# Patient Record
Sex: MALE | Race: OTHER | NOT HISPANIC OR LATINO | URBAN - METROPOLITAN AREA
[De-identification: names, ages, dates, MRNs, and addresses within clinical notes are randomized per-mention and may not be internally consistent; named-entity substitution may affect disease eponyms.]

---

## 2021-09-08 ENCOUNTER — EMERGENCY (EMERGENCY)
Facility: HOSPITAL | Age: 26
LOS: 1 days | Discharge: HOME | End: 2021-09-08
Attending: STUDENT IN AN ORGANIZED HEALTH CARE EDUCATION/TRAINING PROGRAM
Payer: MEDICAID

## 2021-09-08 VITALS
WEIGHT: 210.1 LBS | SYSTOLIC BLOOD PRESSURE: 138 MMHG | DIASTOLIC BLOOD PRESSURE: 76 MMHG | TEMPERATURE: 99 F | RESPIRATION RATE: 18 BRPM | HEART RATE: 73 BPM | OXYGEN SATURATION: 97 %

## 2021-09-08 PROCEDURE — 99285 EMERGENCY DEPT VISIT HI MDM: CPT | Mod: 25

## 2021-09-08 PROCEDURE — 99284 EMERGENCY DEPT VISIT MOD MDM: CPT

## 2021-09-08 PROCEDURE — 96374 THER/PROPH/DIAG INJ IV PUSH: CPT

## 2021-09-08 RX ORDER — ONDANSETRON 8 MG/1
4 TABLET, FILM COATED ORAL ONCE
Refills: 0 | Status: COMPLETED | OUTPATIENT
Start: 2021-09-08 | End: 2021-09-08

## 2021-09-08 RX ORDER — SODIUM CHLORIDE 9 MG/ML
1000 INJECTION INTRAMUSCULAR; INTRAVENOUS; SUBCUTANEOUS ONCE
Refills: 0 | Status: COMPLETED | OUTPATIENT
Start: 2021-09-08 | End: 2021-09-08

## 2021-09-08 RX ORDER — ACETAMINOPHEN 500 MG
650 TABLET ORAL ONCE
Refills: 0 | Status: COMPLETED | OUTPATIENT
Start: 2021-09-08 | End: 2021-09-08

## 2021-09-08 RX ADMIN — ONDANSETRON 4 MILLIGRAM(S): 8 TABLET, FILM COATED ORAL at 22:29

## 2021-09-08 RX ADMIN — Medication 650 MILLIGRAM(S): at 22:29

## 2021-09-08 RX ADMIN — SODIUM CHLORIDE 1000 MILLILITER(S): 9 INJECTION INTRAMUSCULAR; INTRAVENOUS; SUBCUTANEOUS at 22:29

## 2021-09-08 NOTE — ED PROVIDER NOTE - NSFOLLOWUPINSTRUCTIONS_ED_ALL_ED_FT
Opioid Withdrawal    WHAT YOU NEED TO KNOW:    What do I need to know about opioid withdrawal? Withdrawal is a response to a sudden lack of opioids in your body. Withdrawal happens when you suddenly decrease or stop taking an opioid you are dependent on. Dependence means you feel you need the opioid to function mentally or physically. This happens after you have used the opioid regularly for a long time. Withdrawal can happen with an illegal opioid such as heroin, or a prescription opioid such as oxycodone or fentanyl.    What are the signs and symptoms of opioid withdrawal? Withdrawal signs and symptoms may start within 6 to 16 hours after you stop using the opioid. Signs and symptoms include:   •Overwhelming craving for the opioid      •Anxiety, irritability, depression      •Trouble sleeping, or being tired during the day      •Nausea, vomiting, or diarrhea      •Sweating or shaking      •Muscle aches or cramps      •Chills or goosebumps      •Yawning      •Runny nose or watery eyes      How is opioid withdrawal treated? You may need to stay in a hospital or drug treatment facility while you go through withdrawal so healthcare providers can help you. This depends on how long you have used the opioid and how much you have been taking. Your age and general health are also factors. You may need any of the following to treat opioid withdrawal or manage your symptoms:   •Medicines may be used to decrease symptoms such as nausea, vomiting, anxiety, or muscle tension. You may also need medicines to calm your stomach and prevent vomiting. Your healthcare provider may also recommend an over-the-counter medicine to relieve pain and muscle aches.      •Detoxification is the process of slowly decreasing the dose of opioid you are dependent on. Another medicine, such as methadone, may help decrease symptoms of withdrawal.      •Psychological counseling and support may be needed if you have opioid use disorder. This is a condition that makes you crave the opioid and not be able to stop using it.      What can I do to manage withdrawal?   •Drink more liquids to prevent dehydration. You can become dehydrated if you have diarrhea or are vomiting. Drink liquid throughout the day. You may need to sip it to prevent nausea and vomiting. Your healthcare provider may also recommend a oral rehydration solution (ORS). An ORS has the right amounts of water, salts, and sugar you need to replace body fluids.      •Do not use an opioid to relieve your symptoms. You may feel like you cannot get through withdrawal without using the opioid. It can be dangerous to use it during withdrawal. Even if you use a small amount or a different kind of opioid, you can have serious health problems.      What can I do to prevent withdrawal from a prescription opioid? The best way to prevent withdrawal is to prevent tolerance. You may need to take a different kind of pain medicine after a surgery or injury. You can also talk to your healthcare provider about ways to manage pain without medicine. If you do need to take an opioid medicine, the following can help prevent withdrawal:   •Do not suddenly stop using the opioid. If you have been using the opioid for longer than 2 weeks, a sudden stop may cause dangerous side effects. Work with your healthcare provider to decrease your dose slowly.      •Take a prescribed opioid exactly as directed. Do not take more than the recommended amount. Do not take it more often or for longer than recommended. If you use a pain patch, be sure to remove the old patch before you place a new one. Talk to your doctor or a pharmacist if you have any questions about your medicine. Opioids often come with a Medication Guide to help you use it safely. Ask your pharmacists for a copy if you do not get one when you fill the prescription.      •Talk to your provider about signs or symptoms of a problem. Tell your provider if you think you are developing opioid tolerance or dependence. Work with your provider to stop or lower the amount safely.      What do I need to know about opioid safety?   •Do not take opioids that belong to someone else. The kind or amount that person takes may not be right for you.      •Do not mix opioids with other medicines or alcohol. The combination can cause an overdose, or cause you to stop breathing. Alcohol, sleeping pills, and medicines such as antihistamines can make you sleepy. A combination with opioids can lead to a coma.      •Learn about the signs of an overdose so you know how to respond. Tell others about these signs so they will know what to do if needed. Talk to your healthcare provider about naloxone. You may be able to keep naloxone at home in case of an overdose. Your family and friends can also be trained on how to give it to you if needed.      •Keep opioids out of the reach of children. Store opioids in a locked cabinet or in a location that children cannot get to.      •Follow instructions for what to do with prescription opioids you do not use. Your healthcare provider will give you instructions for how to dispose of it safely. This helps make sure no one else takes it.      Call your local emergency number (911 in the [place]), or have someone else call if:   •You have a seizure.      •You cannot be woken.      When should I call my doctor?   •You have a fast heartbeat.      •You have nausea and are vomiting, or you cannot stop vomiting.      •You have the following signs and symptoms of dehydration: ?Dry eyes or mouth      ?Increased thirst      ?Dark yellow urine, or urinating little or not at all      ?Headache, dizziness, or confusion      ?Irregular or fast breathing, fast or pounding heartbeat      •You have questions or concerns about your condition or care.      CARE AGREEMENT:    You have the right to help plan your care. Learn about your health condition and how it may be treated. Discuss treatment options with your healthcare providers to decide what care you want to receive. You always have the right to refuse treatment.

## 2021-09-08 NOTE — ED PROVIDER NOTE - PHYSICAL EXAMINATION
Constitutional: Awake, alert, in no acute distress  Eyes: no scleral icterus  HENT: normocephalic, atraumatic, moist oral mucosa  Neck: supple  CV: RRR, no murmur  Pulm: non-labored respirations, CTAB  Abdomen: soft, non-tender, non-distended  Extremities: no edema, no deformity  Skin: no rash, no jaundice  Neuro: AAOx3, moving all extremities equally

## 2021-09-08 NOTE — ED PROVIDER NOTE - NS ED ROS FT
Constitutional: no fever, no chills  Eyes: no vision changes  ENT: no nasal congestion, no sore throat  CV: no chest pain  Resp: no cough, no shortness of breath  GI: no abdominal pain, +vomiting, +diarrhea  : no dysuria  MSK: no joint pain  Skin: no rash  Neuro: +headache, no weakness, no paresthesias

## 2021-09-08 NOTE — ED PROVIDER NOTE - ATTENDING CONTRIBUTION TO CARE
I performed a face to face history and physical exam of the patient and discussed their management with the student/resident/ACP. I reviewed the student/resident/ACP's note and agree with the documented findings and plan of care.    Pt sent from police precinct for opiate withdrawal. Pt with body aches and nausea. last used percocet 2 d ago. no other complaints.    physical - rrr. ctab. abd - soft, nt. no edema. no rash.    plan - Pt given ivf and antiemetics. Pt cleared for confinement. will d/c back into police custody.

## 2021-09-08 NOTE — ED PROVIDER NOTE - OBJECTIVE STATEMENT
25y M w/ hx opioid use, [facility].  Pt takes Percocet; last used ~2 days ago.  Complains of headache and n/v/d.  Denies other drug use.

## 2021-09-08 NOTE — ED PROVIDER NOTE - PATIENT PORTAL LINK FT
You can access the FollowMyHealth Patient Portal offered by [facility] by registering at the following website: [email or web address]. By joining [employer]’s FollowMyHealth portal, you will also be able to view your health information using other applications (apps) compatible with our system.

## 2022-01-23 ENCOUNTER — EMERGENCY (EMERGENCY)
Facility: HOSPITAL | Age: 27
LOS: 1 days | Discharge: HOME | End: 2022-01-23
Attending: EMERGENCY MEDICINE
Payer: MEDICAID

## 2022-01-23 VITALS
TEMPERATURE: 98 F | DIASTOLIC BLOOD PRESSURE: 82 MMHG | HEART RATE: 78 BPM | SYSTOLIC BLOOD PRESSURE: 122 MMHG | OXYGEN SATURATION: 100 % | RESPIRATION RATE: 19 BRPM

## 2022-01-23 VITALS
RESPIRATION RATE: 18 BRPM | HEART RATE: 74 BPM | OXYGEN SATURATION: 98 % | HEIGHT: 67 IN | DIASTOLIC BLOOD PRESSURE: 83 MMHG | WEIGHT: 190.04 LBS | TEMPERATURE: 100 F | SYSTOLIC BLOOD PRESSURE: 135 MMHG

## 2022-01-23 PROBLEM — F11.10 OPIOID ABUSE, UNCOMPLICATED: Chronic | Status: ACTIVE | Noted: 2021-09-08

## 2022-01-23 PROCEDURE — 99283 EMERGENCY DEPT VISIT LOW MDM: CPT

## 2022-01-23 RX ORDER — BUPRENORPHINE AND NALOXONE 2; .5 MG/1; MG/1
1 TABLET SUBLINGUAL ONCE
Refills: 0 | Status: DISCONTINUED | OUTPATIENT
Start: 2022-01-23 | End: 2022-01-23

## 2022-01-23 RX ORDER — LOPERAMIDE HCL 2 MG
4 TABLET ORAL ONCE
Refills: 0 | Status: COMPLETED | OUTPATIENT
Start: 2022-01-23 | End: 2022-01-23

## 2022-01-23 RX ADMIN — Medication 4 MILLIGRAM(S): at 01:38

## 2022-01-23 RX ADMIN — BUPRENORPHINE AND NALOXONE 1 TABLET(S): 2; .5 TABLET SUBLINGUAL at 01:38

## 2022-01-23 RX ADMIN — Medication 20 MILLIGRAM(S): at 01:38

## 2022-01-23 NOTE — ED PROVIDER NOTE - PATIENT PORTAL LINK FT
You can access the FollowMyHealth Patient Portal offered by [facility] by registering at the following website: [email or web address]. By joining [employer]’s FollowMyHealth portal, you will also be able to view your health information using other applications (apps) compatible with our system.

## 2022-01-23 NOTE — ED PROVIDER NOTE - NSFOLLOWUPINSTRUCTIONS_ED_ALL_ED_FT
You were medicated with Suboxone 8 mg, Imodium 4 mg and Bentyl 20 mg  You are medically stable and for confinement   Return sooner for any problems

## 2022-01-23 NOTE — ED PROVIDER NOTE - OBJECTIVE STATEMENT
25 y/o male with PMHx of opioid abuse brought in by [employer] in custody c/o withdrawal from percocet and fentanyl. Pt states he used 10-15 percocet 30's daily. Pt c/o abdominal pain and loose stool.

## 2022-01-23 NOTE — ED ADULT TRIAGE NOTE - AS TEMP SITE
patient back from [place]. PA at bedside discussing plan of care. Repeat troponin pending. Comfort and safety provided. oral

## 2022-01-23 NOTE — ED ADULT TRIAGE NOTE - CHIEF COMPLAINT QUOTE
C/o substance abuse withdrawal. Pt states, "I take 5-10 "M-Box 30" pills daily, last used yesterday". Pt states, "These are street drugs cut with Fentanyl and Percocet from [place]". +Nausea, diarrhea, cold sweats, and lower abdominal pain. Pt in [employer] custody.

## 2022-03-24 ENCOUNTER — EMERGENCY (EMERGENCY)
Facility: HOSPITAL | Age: 27
LOS: 1 days | Discharge: HOME | End: 2022-03-24
Attending: EMERGENCY MEDICINE
Payer: MEDICAID

## 2022-03-24 VITALS
OXYGEN SATURATION: 98 % | RESPIRATION RATE: 18 BRPM | TEMPERATURE: 98 F | HEART RATE: 74 BPM | SYSTOLIC BLOOD PRESSURE: 99 MMHG | DIASTOLIC BLOOD PRESSURE: 55 MMHG

## 2022-03-24 VITALS
TEMPERATURE: 98 F | SYSTOLIC BLOOD PRESSURE: 107 MMHG | DIASTOLIC BLOOD PRESSURE: 74 MMHG | WEIGHT: 190.04 LBS | OXYGEN SATURATION: 99 % | RESPIRATION RATE: 20 BRPM | HEIGHT: 67 IN | HEART RATE: 80 BPM

## 2022-03-24 LAB
ALBUMIN SERPL ELPH-MCNC: 4.4 G/DL — SIGNIFICANT CHANGE UP (ref 3.3–5.2)
ALP SERPL-CCNC: 67 U/L — SIGNIFICANT CHANGE UP (ref 40–120)
ALT FLD-CCNC: 14 U/L — SIGNIFICANT CHANGE UP
ANION GAP SERPL CALC-SCNC: 13 MMOL/L — SIGNIFICANT CHANGE UP (ref 5–17)
AST SERPL-CCNC: 18 U/L — SIGNIFICANT CHANGE UP
BASOPHILS # BLD AUTO: 0.06 K/UL — SIGNIFICANT CHANGE UP (ref 0–0.2)
BASOPHILS NFR BLD AUTO: 0.6 % — SIGNIFICANT CHANGE UP (ref 0–2)
BILIRUB SERPL-MCNC: 0.4 MG/DL — SIGNIFICANT CHANGE UP (ref 0.4–2)
BUN SERPL-MCNC: 7 MG/DL — LOW (ref 8–20)
CALCIUM SERPL-MCNC: 9.7 MG/DL — SIGNIFICANT CHANGE UP (ref 8.6–10.2)
CHLORIDE SERPL-SCNC: 99 MMOL/L — SIGNIFICANT CHANGE UP (ref 98–107)
CO2 SERPL-SCNC: 24 MMOL/L — SIGNIFICANT CHANGE UP (ref 22–29)
CREAT SERPL-MCNC: 0.87 MG/DL — SIGNIFICANT CHANGE UP (ref 0.5–1.3)
EGFR: 124 ML/MIN/1.73M2 — SIGNIFICANT CHANGE UP
EOSINOPHIL # BLD AUTO: 0.02 K/UL — SIGNIFICANT CHANGE UP (ref 0–0.5)
EOSINOPHIL NFR BLD AUTO: 0.2 % — SIGNIFICANT CHANGE UP (ref 0–6)
ETHANOL SERPL-MCNC: <10 MG/DL — SIGNIFICANT CHANGE UP (ref 0–9)
GLUCOSE SERPL-MCNC: 156 MG/DL — HIGH (ref 70–99)
HCT VFR BLD CALC: 41.1 % — SIGNIFICANT CHANGE UP (ref 39–50)
HGB BLD-MCNC: 13.2 G/DL — SIGNIFICANT CHANGE UP (ref 13–17)
IMM GRANULOCYTES NFR BLD AUTO: 0.3 % — SIGNIFICANT CHANGE UP (ref 0–1.5)
LYMPHOCYTES # BLD AUTO: 0.96 K/UL — LOW (ref 1–3.3)
LYMPHOCYTES # BLD AUTO: 10.3 % — LOW (ref 13–44)
MAGNESIUM SERPL-MCNC: 2.1 MG/DL — SIGNIFICANT CHANGE UP (ref 1.6–2.6)
MCHC RBC-ENTMCNC: 26.7 PG — LOW (ref 27–34)
MCHC RBC-ENTMCNC: 32.1 GM/DL — SIGNIFICANT CHANGE UP (ref 32–36)
MCV RBC AUTO: 83 FL — SIGNIFICANT CHANGE UP (ref 80–100)
MONOCYTES # BLD AUTO: 0.28 K/UL — SIGNIFICANT CHANGE UP (ref 0–0.9)
MONOCYTES NFR BLD AUTO: 3 % — SIGNIFICANT CHANGE UP (ref 2–14)
NEUTROPHILS # BLD AUTO: 8 K/UL — HIGH (ref 1.8–7.4)
NEUTROPHILS NFR BLD AUTO: 85.6 % — HIGH (ref 43–77)
PHOSPHATE SERPL-MCNC: 1.9 MG/DL — LOW (ref 2.4–4.7)
PLATELET # BLD AUTO: 360 K/UL — SIGNIFICANT CHANGE UP (ref 150–400)
POTASSIUM SERPL-MCNC: 4.2 MMOL/L — SIGNIFICANT CHANGE UP (ref 3.5–5.3)
POTASSIUM SERPL-SCNC: 4.2 MMOL/L — SIGNIFICANT CHANGE UP (ref 3.5–5.3)
PROT SERPL-MCNC: 8.2 G/DL — SIGNIFICANT CHANGE UP (ref 6.6–8.7)
RBC # BLD: 4.95 M/UL — SIGNIFICANT CHANGE UP (ref 4.2–5.8)
RBC # FLD: 13.7 % — SIGNIFICANT CHANGE UP (ref 10.3–14.5)
SODIUM SERPL-SCNC: 136 MMOL/L — SIGNIFICANT CHANGE UP (ref 135–145)
WBC # BLD: 9.35 K/UL — SIGNIFICANT CHANGE UP (ref 3.8–10.5)
WBC # FLD AUTO: 9.35 K/UL — SIGNIFICANT CHANGE UP (ref 3.8–10.5)

## 2022-03-24 PROCEDURE — 96375 TX/PRO/DX INJ NEW DRUG ADDON: CPT

## 2022-03-24 PROCEDURE — 83735 ASSAY OF MAGNESIUM: CPT

## 2022-03-24 PROCEDURE — U0005: CPT

## 2022-03-24 PROCEDURE — 99284 EMERGENCY DEPT VISIT MOD MDM: CPT

## 2022-03-24 PROCEDURE — 96374 THER/PROPH/DIAG INJ IV PUSH: CPT

## 2022-03-24 PROCEDURE — 84100 ASSAY OF PHOSPHORUS: CPT

## 2022-03-24 PROCEDURE — 80307 DRUG TEST PRSMV CHEM ANLYZR: CPT

## 2022-03-24 PROCEDURE — 99284 EMERGENCY DEPT VISIT MOD MDM: CPT | Mod: 25

## 2022-03-24 PROCEDURE — 85025 COMPLETE CBC W/AUTO DIFF WBC: CPT

## 2022-03-24 PROCEDURE — U0003: CPT

## 2022-03-24 PROCEDURE — 96376 TX/PRO/DX INJ SAME DRUG ADON: CPT

## 2022-03-24 PROCEDURE — 93010 ELECTROCARDIOGRAM REPORT: CPT

## 2022-03-24 PROCEDURE — 36415 COLL VENOUS BLD VENIPUNCTURE: CPT

## 2022-03-24 PROCEDURE — 93005 ELECTROCARDIOGRAM TRACING: CPT

## 2022-03-24 PROCEDURE — 80053 COMPREHEN METABOLIC PANEL: CPT

## 2022-03-24 RX ORDER — BUPRENORPHINE AND NALOXONE 2; .5 MG/1; MG/1
1 TABLET SUBLINGUAL ONCE
Refills: 0 | Status: DISCONTINUED | OUTPATIENT
Start: 2022-03-24 | End: 2022-03-24

## 2022-03-24 RX ORDER — SODIUM CHLORIDE 9 MG/ML
1000 INJECTION INTRAMUSCULAR; INTRAVENOUS; SUBCUTANEOUS ONCE
Refills: 0 | Status: COMPLETED | OUTPATIENT
Start: 2022-03-24 | End: 2022-03-24

## 2022-03-24 RX ORDER — SODIUM,POTASSIUM PHOSPHATES 278-250MG
2 POWDER IN PACKET (EA) ORAL EVERY 4 HOURS
Refills: 0 | Status: COMPLETED | OUTPATIENT
Start: 2022-03-24 | End: 2022-03-24

## 2022-03-24 RX ORDER — ONDANSETRON 8 MG/1
8 TABLET, FILM COATED ORAL ONCE
Refills: 0 | Status: COMPLETED | OUTPATIENT
Start: 2022-03-24 | End: 2022-03-24

## 2022-03-24 RX ADMIN — Medication 1 TABLET(S): at 15:47

## 2022-03-24 RX ADMIN — Medication 2 TABLET(S): at 19:38

## 2022-03-24 RX ADMIN — BUPRENORPHINE AND NALOXONE 1 TABLET(S): 2; .5 TABLET SUBLINGUAL at 19:55

## 2022-03-24 RX ADMIN — Medication 1 MILLIGRAM(S): at 19:54

## 2022-03-24 RX ADMIN — Medication 2 MILLIGRAM(S): at 15:38

## 2022-03-24 RX ADMIN — SODIUM CHLORIDE 1000 MILLILITER(S): 9 INJECTION INTRAMUSCULAR; INTRAVENOUS; SUBCUTANEOUS at 15:38

## 2022-03-24 RX ADMIN — ONDANSETRON 8 MILLIGRAM(S): 8 TABLET, FILM COATED ORAL at 15:38

## 2022-03-24 RX ADMIN — Medication 2 TABLET(S): at 23:25

## 2022-03-24 NOTE — ED ADULT NURSE NOTE - CHPI ED NUR SYMPTOMS NEG
no abdominal distension/no abdominal pain/no chills/no confusion/no disorientation/no fever/no weakness

## 2022-03-24 NOTE — ED PROVIDER NOTE - OBJECTIVE STATEMENT
Pertinent PMH/PSH/FHx/SHx and Review of Systems contained within:  Patient presents to the ED for reported EtOH and opiate withdrawal.  States last smoked percocet and drank EtOh about 1-1.5 days ago.  CIWA 6. Patient states has N/V/D.  VSS.  no trauma..  No PMH.  otherwise baseline. Non toxic.  Well appearing. No aggravating or relieving factors. No other pertinent PMH.  No other pertinent PSH.  No other pertinent FHx.  Patient denies EtOH/tobacco/illicit substance use. No fever/chills, No photophobia/eye pain/changes in vision, No ear pain/sore throat/dysphagia, No palpitations, no SOB/cough/wheeze/stridor, No abdominal pain,  no dysuria/frequency/discharge, No neck/back pain, no rash, no changes in neurological status/function.

## 2022-03-24 NOTE — ED ADULT NURSE NOTE - OBJECTIVE STATEMENT
Patient presented to Ed with ETOH withdrawal and percocet withdrawal. Ciwa score at this time is a 9. Patient last smoked percocet and drank alcohol was 1 to 1.5 days ago. Medications given as ordered with blood work completed.

## 2022-03-24 NOTE — ED ADULT NURSE REASSESSMENT NOTE - NS ED NURSE REASSESS COMMENT FT1
Assumed care for patient. Patient was agitated and non complaint to RNs wants, Patient kept getting up to visit his brother.  patient was requesting suboxone. Medication was ordered and given. Patient then got up went to see his brother and gave him half the medication. RN called for assistance Patient was assisted back to his room. Ativan given x2. Ciwa score is now a 0, Patient is now sleeping and calm at this time.

## 2022-03-24 NOTE — ED ADULT TRIAGE NOTE - CHIEF COMPLAINT QUOTE
pt c/o alcohol withdrawal & chest pain pt c/o alcohol withdrawal & chest pain  last drink 1 day ago,   A&Ox3, resp wnl,

## 2022-03-24 NOTE — ED PROVIDER NOTE - CARE PLAN
Principal Discharge DX:	Opiate dependence  Secondary Diagnosis:	Alcohol dependence with withdrawal   1

## 2022-03-24 NOTE — ED PROVIDER NOTE - CLINICAL SUMMARY MEDICAL DECISION MAKING FREE TEXT BOX
Patient presents with combined opiate and alcohol withdrawal.  lorazepam and buprenorphine given.  CIWA of 10 and COWS of 13.  Lab values do not require emergent intervention. phosphate low and repleted.  Patient wants to go to rehab.  VSS.  Uneventful ED observation period. Case d/w Dr. Lowe at [facility] and accepted for detox.  Discussed signs and symptoms and reasons for return with good teachback. SW following.

## 2022-03-24 NOTE — CHART NOTE - NSCHARTNOTEFT_GEN_A_CORE
MAYUR Note: MAYUR made aware pt is here for detox. SW met with pt, pt reports he uses 10 percocet pills daily as well as drinks a gallon of liquor a day,, last use yesterday. Pt reports he has been to [facility] a few months ago for detox, SW explained ro pt that they do not accept outside trx to their detox unit. Pt is agreeable to trx to [facility] for detox should they have a bed, ED provider aware, MYAUR following MAYUR Note: MAYUR made aware pt is here for detox. SW met with pt, pt reports he uses 10 percocet pills daily as well as drinks a gallon of liquor a day,, last use yesterday. Pt reports he has been to [facility] a few months ago for detox, MAYUR explained to pt that they do not accept outside trx to their detox unit. Pt is agreeable to trx to [facility] for detox should they have a bed, ED provider aware, MAYUR following

## 2022-03-24 NOTE — ED PROVIDER NOTE - PATIENT PORTAL LINK FT
You can access the FollowMyHealth Patient Portal offered by [facility] by registering at the following website: [email or web address]. By joining [employer]’s FollowMyHealth portal, you will also be able to view your health information using other applications (apps) compatible with our system.

## 2022-03-25 LAB — SARS-COV-2 RNA SPEC QL NAA+PROBE: SIGNIFICANT CHANGE UP

## 2022-06-13 ENCOUNTER — EMERGENCY (EMERGENCY)
Facility: HOSPITAL | Age: 27
LOS: 1 days | Discharge: HOME | End: 2022-06-13
Attending: EMERGENCY MEDICINE
Payer: MEDICAID

## 2022-06-13 VITALS
OXYGEN SATURATION: 99 % | RESPIRATION RATE: 18 BRPM | TEMPERATURE: 98 F | DIASTOLIC BLOOD PRESSURE: 76 MMHG | HEART RATE: 80 BPM | WEIGHT: 195.99 LBS | SYSTOLIC BLOOD PRESSURE: 116 MMHG | HEIGHT: 67 IN

## 2022-06-13 LAB
ALBUMIN SERPL ELPH-MCNC: 4.5 G/DL — SIGNIFICANT CHANGE UP (ref 3.3–5.2)
ALP SERPL-CCNC: 61 U/L — SIGNIFICANT CHANGE UP (ref 40–120)
ALT FLD-CCNC: 8 U/L — SIGNIFICANT CHANGE UP
ANION GAP SERPL CALC-SCNC: 13 MMOL/L — SIGNIFICANT CHANGE UP (ref 5–17)
APAP SERPL-MCNC: <3 UG/ML — LOW (ref 10–26)
APPEARANCE UR: CLEAR — SIGNIFICANT CHANGE UP
AST SERPL-CCNC: 12 U/L — SIGNIFICANT CHANGE UP
BASOPHILS # BLD AUTO: 0.07 K/UL — SIGNIFICANT CHANGE UP (ref 0–0.2)
BASOPHILS NFR BLD AUTO: 0.9 % — SIGNIFICANT CHANGE UP (ref 0–2)
BILIRUB SERPL-MCNC: 0.4 MG/DL — SIGNIFICANT CHANGE UP (ref 0.4–2)
BILIRUB UR-MCNC: NEGATIVE — SIGNIFICANT CHANGE UP
BUN SERPL-MCNC: 9.3 MG/DL — SIGNIFICANT CHANGE UP (ref 8–20)
CALCIUM SERPL-MCNC: 9.4 MG/DL — SIGNIFICANT CHANGE UP (ref 8.6–10.2)
CHLORIDE SERPL-SCNC: 99 MMOL/L — SIGNIFICANT CHANGE UP (ref 98–107)
CO2 SERPL-SCNC: 25 MMOL/L — SIGNIFICANT CHANGE UP (ref 22–29)
COLOR SPEC: YELLOW — SIGNIFICANT CHANGE UP
CREAT SERPL-MCNC: 0.77 MG/DL — SIGNIFICANT CHANGE UP (ref 0.5–1.3)
DIFF PNL FLD: NEGATIVE — SIGNIFICANT CHANGE UP
EGFR: 128 ML/MIN/1.73M2 — SIGNIFICANT CHANGE UP
EOSINOPHIL # BLD AUTO: 0.04 K/UL — SIGNIFICANT CHANGE UP (ref 0–0.5)
EOSINOPHIL NFR BLD AUTO: 0.5 % — SIGNIFICANT CHANGE UP (ref 0–6)
ETHANOL SERPL-MCNC: <10 MG/DL — SIGNIFICANT CHANGE UP (ref 0–9)
GLUCOSE SERPL-MCNC: 126 MG/DL — HIGH (ref 70–99)
GLUCOSE UR QL: NEGATIVE MG/DL — SIGNIFICANT CHANGE UP
HCT VFR BLD CALC: 38.5 % — LOW (ref 39–50)
HGB BLD-MCNC: 12.5 G/DL — LOW (ref 13–17)
HIV 1 & 2 AB SERPL IA.RAPID: SIGNIFICANT CHANGE UP
IMM GRANULOCYTES NFR BLD AUTO: 0.3 % — SIGNIFICANT CHANGE UP (ref 0–1.5)
KETONES UR-MCNC: NEGATIVE — SIGNIFICANT CHANGE UP
LEUKOCYTE ESTERASE UR-ACNC: NEGATIVE — SIGNIFICANT CHANGE UP
LYMPHOCYTES # BLD AUTO: 2.05 K/UL — SIGNIFICANT CHANGE UP (ref 1–3.3)
LYMPHOCYTES # BLD AUTO: 26.7 % — SIGNIFICANT CHANGE UP (ref 13–44)
MCHC RBC-ENTMCNC: 26.8 PG — LOW (ref 27–34)
MCHC RBC-ENTMCNC: 32.5 GM/DL — SIGNIFICANT CHANGE UP (ref 32–36)
MCV RBC AUTO: 82.6 FL — SIGNIFICANT CHANGE UP (ref 80–100)
MONOCYTES # BLD AUTO: 0.3 K/UL — SIGNIFICANT CHANGE UP (ref 0–0.9)
MONOCYTES NFR BLD AUTO: 3.9 % — SIGNIFICANT CHANGE UP (ref 2–14)
NEUTROPHILS # BLD AUTO: 5.21 K/UL — SIGNIFICANT CHANGE UP (ref 1.8–7.4)
NEUTROPHILS NFR BLD AUTO: 67.7 % — SIGNIFICANT CHANGE UP (ref 43–77)
NITRITE UR-MCNC: NEGATIVE — SIGNIFICANT CHANGE UP
PCP SPEC-MCNC: SIGNIFICANT CHANGE UP
PH UR: 8 — SIGNIFICANT CHANGE UP (ref 5–8)
PLATELET # BLD AUTO: 327 K/UL — SIGNIFICANT CHANGE UP (ref 150–400)
POTASSIUM SERPL-MCNC: 4 MMOL/L — SIGNIFICANT CHANGE UP (ref 3.5–5.3)
POTASSIUM SERPL-SCNC: 4 MMOL/L — SIGNIFICANT CHANGE UP (ref 3.5–5.3)
PROT SERPL-MCNC: 7.7 G/DL — SIGNIFICANT CHANGE UP (ref 6.6–8.7)
PROT UR-MCNC: NEGATIVE — SIGNIFICANT CHANGE UP
RAPID RVP RESULT: SIGNIFICANT CHANGE UP
RBC # BLD: 4.66 M/UL — SIGNIFICANT CHANGE UP (ref 4.2–5.8)
RBC # FLD: 13.3 % — SIGNIFICANT CHANGE UP (ref 10.3–14.5)
SALICYLATES SERPL-MCNC: <0.6 MG/DL — LOW (ref 10–20)
SARS-COV-2 RNA SPEC QL NAA+PROBE: SIGNIFICANT CHANGE UP
SODIUM SERPL-SCNC: 137 MMOL/L — SIGNIFICANT CHANGE UP (ref 135–145)
SP GR SPEC: 1.01 — SIGNIFICANT CHANGE UP (ref 1.01–1.02)
UROBILINOGEN FLD QL: NEGATIVE MG/DL — SIGNIFICANT CHANGE UP
WBC # BLD: 7.69 K/UL — SIGNIFICANT CHANGE UP (ref 3.8–10.5)
WBC # FLD AUTO: 7.69 K/UL — SIGNIFICANT CHANGE UP (ref 3.8–10.5)

## 2022-06-13 PROCEDURE — 99285 EMERGENCY DEPT VISIT HI MDM: CPT

## 2022-06-13 PROCEDURE — 70450 CT HEAD/BRAIN W/O DYE: CPT | Mod: 26,MA

## 2022-06-13 RX ORDER — BUPRENORPHINE AND NALOXONE 2; .5 MG/1; MG/1
1 TABLET SUBLINGUAL ONCE
Refills: 0 | Status: DISCONTINUED | OUTPATIENT
Start: 2022-06-13 | End: 2022-06-13

## 2022-06-13 RX ORDER — SODIUM CHLORIDE 9 MG/ML
3 INJECTION INTRAMUSCULAR; INTRAVENOUS; SUBCUTANEOUS ONCE
Refills: 0 | Status: COMPLETED | OUTPATIENT
Start: 2022-06-13 | End: 2022-06-13

## 2022-06-13 RX ORDER — DIAZEPAM 5 MG
5 TABLET ORAL ONCE
Refills: 0 | Status: DISCONTINUED | OUTPATIENT
Start: 2022-06-13 | End: 2022-06-13

## 2022-06-13 RX ADMIN — Medication 5 MILLIGRAM(S): at 20:41

## 2022-06-13 RX ADMIN — Medication 0.1 MILLIGRAM(S): at 20:41

## 2022-06-13 RX ADMIN — SODIUM CHLORIDE 3 MILLILITER(S): 9 INJECTION INTRAMUSCULAR; INTRAVENOUS; SUBCUTANEOUS at 21:56

## 2022-06-13 NOTE — ED PROVIDER NOTE - NEURO NEGATIVE STATEMENT, MLM
(+)tenderness to right side of head, no loss of consciousness, no gait abnormality, no sensory deficits, and no weakness.

## 2022-06-13 NOTE — ED ADULT NURSE NOTE - OBJECTIVE STATEMENT
Pt requesting detox from alcohol and opiates. Pt reports taking "fake" percocet that is fentanyl as well as heroin. Drinks 750ml of vodka per day. Last drink was yesterday.

## 2022-06-13 NOTE — ED PROVIDER NOTE - NSICDXPASTMEDICALHX_GEN_ALL_CORE_FT
PAST MEDICAL HISTORY:  Opioid abuse        PAST MEDICAL HISTORY:  Alcohol abuse     Opioid abuse

## 2022-06-13 NOTE — ED PROVIDER NOTE - NS_ATTENDINGSCRIBE_ED_ALL_ED
carried
I personally performed the service described in the documentation recorded by the scribe in my presence, and it accurately and completely records my words and actions.
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
## 2022-06-13 NOTE — ED PROVIDER NOTE - NEUROLOGICAL, MLM
(+)gross motor symmetric and normal, Alert and oriented, no focal deficits, no motor or sensory deficits.

## 2022-06-13 NOTE — ED PROVIDER NOTE - NSFOLLOWUPINSTRUCTIONS_ED_ALL_ED_FT
return to the ED if symptoms worsen, fever/chills, nausea/vomiting, chest pain, shortness of breath. You [facility] today.

## 2022-06-13 NOTE — ED PROVIDER NOTE - CLINICAL SUMMARY MEDICAL DECISION MAKING FREE TEXT BOX
pt with polysubstance abuse presenting with withdrawal symptoms and requesting detox. Will assess for significant acute metabolic condition and if stable will treat his symptoms and attempt to help him get into a detox program

## 2022-06-13 NOTE — ED ADULT NURSE NOTE - NSIMPLEMENTINTERV_GEN_ALL_ED
Implemented All Universal Safety Interventions:  [place] to call system. Call bell, personal items and telephone within reach. Instruct patient to call for assistance. Room bathroom lighting operational. Non-slip footwear when patient is off stretcher. Physically safe environment: no spills, clutter or unnecessary equipment. Stretcher in lowest position, wheels locked, appropriate side rails in place.

## 2022-06-13 NOTE — ED PROVIDER NOTE - CONSTITUTIONAL, MLM
normal... (+)uncomfortable appearing, awake, alert, oriented to person, place, time/situation and in no apparent distress. (+)uncomfortable appearing, awake, alert, oriented to person, place, time/situation

## 2022-06-13 NOTE — ED PROVIDER NOTE - PATIENT PORTAL LINK FT
You can access the FollowMyHealth Patient Portal offered by [facility] by registering at the following website: [email or web address]. By joining [employer]s FollowMyHealth portal, you will also be able to view your health information using other applications (apps) compatible with our system.

## 2022-06-13 NOTE — ED PROVIDER NOTE - PROGRESS NOTE DETAILS
Michael ALVAREZ for ED attending, Dr. Boyce, pt requesting Suboxone stating he used to be on 12mg years ago. Still states he has not used any opiates in over 24 hours Patient with improved symptoms but still states he is uncomfortable. Will re-medicate. Patient resting comfortably. polly: pt signed out by dr. galdamez pending sbirt eval- now accepted to [facility] by dr. piper for detox
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
## 2022-06-14 VITALS
RESPIRATION RATE: 18 BRPM | DIASTOLIC BLOOD PRESSURE: 65 MMHG | TEMPERATURE: 99 F | SYSTOLIC BLOOD PRESSURE: 110 MMHG | OXYGEN SATURATION: 98 % | HEART RATE: 72 BPM

## 2022-06-14 PROCEDURE — 81003 URINALYSIS AUTO W/O SCOPE: CPT

## 2022-06-14 PROCEDURE — 70450 CT HEAD/BRAIN W/O DYE: CPT | Mod: MA

## 2022-06-14 PROCEDURE — 99285 EMERGENCY DEPT VISIT HI MDM: CPT | Mod: 25

## 2022-06-14 PROCEDURE — 80053 COMPREHEN METABOLIC PANEL: CPT

## 2022-06-14 PROCEDURE — 93010 ELECTROCARDIOGRAM REPORT: CPT

## 2022-06-14 PROCEDURE — 93005 ELECTROCARDIOGRAM TRACING: CPT

## 2022-06-14 PROCEDURE — 96374 THER/PROPH/DIAG INJ IV PUSH: CPT

## 2022-06-14 PROCEDURE — 36415 COLL VENOUS BLD VENIPUNCTURE: CPT

## 2022-06-14 PROCEDURE — 80307 DRUG TEST PRSMV CHEM ANLYZR: CPT

## 2022-06-14 PROCEDURE — 86703 HIV-1/HIV-2 1 RESULT ANTBDY: CPT

## 2022-06-14 PROCEDURE — 0225U NFCT DS DNA&RNA 21 SARSCOV2: CPT

## 2022-06-14 PROCEDURE — 85025 COMPLETE CBC W/AUTO DIFF WBC: CPT

## 2022-06-14 RX ORDER — BUPRENORPHINE AND NALOXONE 2; .5 MG/1; MG/1
1 TABLET SUBLINGUAL ONCE
Refills: 0 | Status: DISCONTINUED | OUTPATIENT
Start: 2022-06-14 | End: 2022-06-14

## 2022-06-14 RX ORDER — ONDANSETRON 8 MG/1
4 TABLET, FILM COATED ORAL ONCE
Refills: 0 | Status: COMPLETED | OUTPATIENT
Start: 2022-06-14 | End: 2022-06-14

## 2022-06-14 RX ADMIN — BUPRENORPHINE AND NALOXONE 1 TABLET(S): 2; .5 TABLET SUBLINGUAL at 07:55

## 2022-06-14 RX ADMIN — BUPRENORPHINE AND NALOXONE 1 TABLET(S): 2; .5 TABLET SUBLINGUAL at 14:32

## 2022-06-14 RX ADMIN — Medication 25 MILLIGRAM(S): at 09:09

## 2022-06-14 RX ADMIN — ONDANSETRON 4 MILLIGRAM(S): 8 TABLET, FILM COATED ORAL at 09:09

## 2022-06-14 RX ADMIN — BUPRENORPHINE AND NALOXONE 1 TABLET(S): 2; .5 TABLET SUBLINGUAL at 00:05

## 2022-06-14 NOTE — ED ADULT NURSE REASSESSMENT NOTE - NS ED NURSE REASSESS COMMENT FT1
Pt asking for Suboxone. CIWA as documented. Report given to [facility] facility RN. Chart to be faxed over. IV dc per [facility] nurse request. Pt to be picked up at 1730 pm. Made aware and agrees to plan. Will continue to monitor.

## 2022-06-14 NOTE — CHART NOTE - NSCHARTNOTEFT_GEN_A_CORE
SOCIAL WORK NOTE: PEER TO PEER COMPLETED WITH DR TONY AND DR POWERS AND PATIENT ACCEPTED TO [facility] FOR INPATIENT DETOX. AMBULANCE ARRANGED FOR 5;30 PM AND [facility] AND AMBULANCE BILLING FORM LEFT ON DC RACK. ALL CLINICALS FAXED TO [facility] TO FAX # [phone].

## 2022-06-14 NOTE — ED ADULT NURSE REASSESSMENT NOTE - NS ED NURSE REASSESS COMMENT FT1
pt given a lunch tray. AAO x3. Offers no complaints at this time. Awaiting POC. Will continue to monitor

## 2022-06-14 NOTE — SBIRT NOTE ADULT - NSSBIRTALCPASSREFTXDET_GEN_A_CORE
Provided SBIRT services: Full screen positive. Referral to Treatment Performed. Screening results were   reviewed with the patient and patient was provided information about healthy guidelines and potential negative   consequences associated with level of risk. Motivation and readiness to reduce or stop use was discussed and   goals and activities to make changes were suggested/offered

## 2022-06-14 NOTE — ED ADULT NURSE REASSESSMENT NOTE - NS ED NURSE REASSESS COMMENT FT1
Pt AAO x3 in stretcher. Lungs CTA. Abd s/nt. Skin intact. IV in place and flushing with blood return. Awaiting SBIRT. Breakfast tray given. Will continue to monitor. In view of nursing station. CHARTED AT 0800:     Pt AAO x3 in stretcher. Lungs CTA. Abd s/nt. Skin intact. IV in place and flushing with blood return. Awaiting SBIRT. Breakfast tray given. Will continue to monitor. In view of nursing station.

## 2022-06-20 ENCOUNTER — EMERGENCY (EMERGENCY)
Facility: HOSPITAL | Age: 27
LOS: 1 days | Discharge: HOME | End: 2022-06-20
Attending: EMERGENCY MEDICINE
Payer: MEDICAID

## 2022-06-20 VITALS
HEIGHT: 67 IN | OXYGEN SATURATION: 96 % | TEMPERATURE: 98 F | SYSTOLIC BLOOD PRESSURE: 110 MMHG | RESPIRATION RATE: 20 BRPM | WEIGHT: 184.97 LBS | DIASTOLIC BLOOD PRESSURE: 66 MMHG | HEART RATE: 59 BPM

## 2022-06-20 PROCEDURE — 99285 EMERGENCY DEPT VISIT HI MDM: CPT

## 2022-06-20 PROCEDURE — 72125 CT NECK SPINE W/O DYE: CPT | Mod: MA

## 2022-06-20 PROCEDURE — 99284 EMERGENCY DEPT VISIT MOD MDM: CPT | Mod: 25

## 2022-06-20 PROCEDURE — 70450 CT HEAD/BRAIN W/O DYE: CPT | Mod: MA

## 2022-06-20 NOTE — ED PROVIDER NOTE - PROGRESS NOTE DETAILS
Christy Morales, Resident: [facility]. [facility] states the pt was never admitted or a patient of the hospital and was just beginning the admissions process. Pt improved, no complaints at this time. Pt demonstrates understanding of condition, return precautions, red flags, and need for follow up and agrees with plan.

## 2022-06-20 NOTE — ED PROVIDER NOTE - OBJECTIVE STATEMENT
Pt is 26 y.o. M hx EtOH abuse, opioid abuse presenting after fall today. The pt states he was standing up, tripped, and fell backwards. +Head trauma, no LOC. Denies neck pain, numbness, tingling or weakness.

## 2022-06-20 NOTE — ED PROVIDER NOTE - NSFOLLOWUPCLINICS_GEN_ALL_ED_FT
[id number] [place] [address]  Phone: [phone]  Fax:   Follow Up Time: 4-6 Days

## 2022-06-20 NOTE — ED PROVIDER NOTE - PATIENT PORTAL LINK FT
You can access the FollowMyHealth Patient Portal offered by [facility] by registering at the following website: [email or web address]. By joining [employer]’s FollowMyHealth portal, you will also be able to view your health information using other applications (apps) compatible with our system.

## 2022-06-20 NOTE — ED ADULT TRIAGE NOTE - CHIEF COMPLAINT QUOTE
S/P fall with LOC at [facility]. PT went there for rehab and while being triaged fell backwards from standing height and hit head.  No blood thinners. Hematoma noted to back of head and C/O pain to site. GCS15. Able to MAEx4 with equal strength. Denies dizziness or blurred vision. Reports taking 3 Trazadone and one 8mg of Suboxone today. IV in place by EMS with 1000ml NS bolus infusing.

## 2022-06-20 NOTE — ED PROVIDER NOTE - NSFOLLOWUPINSTRUCTIONS_ED_ALL_ED_FT
Head Injury, Adult       There are many types of head injuries. They can be as minor as a small bump. Some head injuries can be worse. Worse injuries include:    A strong hit to the head that shakes the brain back and forth, causing damage (concussion).  A bruise (contusion) of the brain. This means there is bleeding in the brain that can cause swelling.  A cracked skull (skull fracture).  Bleeding in the brain that gathers, gets thick (makes a clot), and forms a bump (hematoma).    Most problems from a head injury come in the first 24 hours. However, you may still have side effects up to 7–10 days after your injury. It is important to watch your condition for any changes. You may need to be watched in the emergency department or urgent care, or you may need to stay in the hospital.    What are the causes?  There are many possible causes of a head injury. A serious head injury may be caused by:    A car accident.  Bicycle or motorcycle accidents.  Sports injuries.  Falls.  Being hit by an object.    What are the signs or symptoms?  Symptoms of a head injury include a bruise, bump, or bleeding where the injury happened. Other physical symptoms may include:    Headache.  Feeling like you may vomit (nauseous) or vomiting.  Dizziness.  Blurred or double vision.  Being uncomfortable around bright lights or loud noises.  Shaking movements that you cannot control (seizures).  Feeling tired.  Trouble being woken up.  Fainting or loss of consciousness.    Mental or emotional symptoms may include:    Feeling grumpy or cranky.  Confusion and memory problems.  Having trouble paying attention or concentrating.  Changes in eating or sleeping habits.  Feeling worried or nervous (anxious).  Feeling sad (depressed).    How is this treated?  Treatment for this condition depends on how severe the injury is and the type of injury you have. The main goal is to prevent problems and to allow the brain time to heal.        Mild head injury    If you have a mild head injury, you may be sent home, and treatment may include:    Being watched. A responsible adult should stay with you for 24 hours after your injury and check on you often.  Physical rest.  Brain rest.  Pain medicines.        Severe head injury    If you have a severe head injury, treatment may include:    Being watched closely. This includes staying in the hospital.  Medicines to:    Help with pain.  Prevent seizures.  Help with brain swelling.  Protecting your airway and using a machine that helps you breathe (ventilator).  Treatments to watch for and manage swelling inside the brain.  Brain surgery. This may be needed to:    Remove a collection of blood or blood clots.  Stop the bleeding.  Remove a part of the skull. This allows room for the brain to swell.    Follow these instructions at home:      Activity    Rest.  Avoid activities that are hard or tiring.  Make sure you get enough sleep.  Let your brain rest. Do this by limiting activities that need a lot of thought or attention, such as:    Watching TV.  Playing memory games and puzzles.  Job-related work or homework.  Working on the computer, social media, and texting.  Avoid activities that could cause another head injury until your doctor says it is okay. This includes playing sports. Having another head injury, especially before the first one has healed, can be dangerous.  Ask your doctor when it is safe for you to go back to your normal activities, such as work or school. Ask your doctor for a step-by-step plan for slowly going back to your normal activities.  Ask your doctor when you can drive, ride a bicycle, or use heavy machinery. Do not do these activities if you are dizzy.        Lifestyle     Do not drink alcohol until your doctor says it is okay.  Do not use drugs.  If it is harder than usual to remember things, write them down.  If you are easily distracted, try to do one thing at a time.  Talk with family members or close friends when making important decisions.  Tell your friends, family, a trusted co-worker, and  about your injury, symptoms, and limits (restrictions). Have them watch for any problems that are new or getting worse.        General instructions    Take over-the-counter and prescription medicines only as told by your doctor.  Have someone stay with you for 24 hours after your head injury. This person should watch you for any changes in your symptoms and be ready to get help.  Keep all follow-up visits as told by your doctor. This is important.        How is this prevented?    Work on your balance and strength. This can help you avoid falls.  Wear a seat belt when you are in a moving vehicle.  Wear a helmet when you:    Ride a bicycle.  Ski.  Do any other sport or activity that has a risk of injury.  If you drink alcohol:    Limit how much you use to:    0–1 drink a day for nonpregnant women.  0–2 drinks a day for men.  Be aware of how much alcohol is in your drink. In the [place] one drink equals one 12 oz bottle of beer (355 mL), one 5 oz glass of wine (148 mL), or one 1½ oz glass of hard liquor (44 mL).  Make your home safer by:    Getting rid of clutter from the floors and stairs. This includes things that can make you trip.  Using grab bars in bathrooms and handrails by stairs.  Placing non-slip mats on floors and in bathtubs.  Putting more light in dim areas.    Where to find more information  Centers for Disease Control and Prevention: www.cdc.gov    Get help right away if:  You have:    A very bad headache that is not helped by medicine.  Trouble walking or weakness in your arms and legs.  Clear or bloody fluid coming from your nose or ears.  Changes in how you see (vision).  A seizure.  More confusion or more grumpy moods.  Your symptoms get worse.  You are sleepier than normal and have trouble staying awake.  You lose your balance.  The black centers of your eyes (pupils) change in size.  Your speech is slurred.  Your dizziness gets worse.  You vomit.    These symptoms may be an emergency. Do not wait to see if the symptoms will go away. Get medical help right away. Call your local emergency services (911 in the [place] Do not drive yourself to the hospital.    Summary  Head injuries can be as minor as a small bump. Some head injuries can be worse.  Treatment for this condition depends on how severe the injury is and the type of injury you have.  Have someone stay with you for 24 hours after your head injury.  Ask your doctor when it is safe for you to go back to your normal activities, such as work or school.  To prevent a head injury, wear a seat belt in a car, wear a helmet when you use a bicycle, limit your alcohol use, and make your home safer.    ADDITIONAL NOTES AND INSTRUCTIONS    Please follow up with your Primary MD in 24-48 hr.  Seek immediate medical care for any new/worsening signs or symptoms.

## 2022-06-20 NOTE — ED ADULT NURSE NOTE - CHIEF COMPLAINT QUOTE
S/P fall with LOC at [facility]. PT went there for rehab and while being triaged fell backwards from standing height and hit head.  No blood thinners. Hematoma noted to back of head and C/O pain to site. GCS15. Able to MAEx4 with equal strength. Denies dizziness or blurred vision. Reports taking 3 Trazadone and one 8mg of Suboxone today. IV in place by EMS with 1000ml NS bolus infusing.

## 2022-06-20 NOTE — ED PROVIDER NOTE - ATTENDING CONTRIBUTION TO CARE
I personally saw the patient with the resident, and completed the key components of the history and physical exam. I then discussed the management plan with the resident.      General: NAD, ENMT: Airway patent, mucous membranes moist, Cardiac: Normal rate, regular rhythm, Respiratory: breath sounds equal and clear bilaterally Head: NC/AT

## 2022-06-20 NOTE — ED PROVIDER NOTE - PHYSICAL EXAMINATION
General: well appearing, NAD  Head:  NC, AT  Eyes: EOMI, PERRLA, no scleral icterus  Ears: no erythema/drainage  Nose: midline, no bleeding/drainage  Throat: MMM  Cardiac: RRR, no m/r/g, no lower extremity edema  Respiratory: CTABL, no wheezes/rales/rhonchi, equal chest wall expansions, no use of accessory muscles, no retractions  Abdomen: soft, nondistended, nontender, no rebound tenderness, no guarding, nonperitonitic  MSK/Vascular: full ROM, soft compartments, warm extremities  Neuro: Alert and oriented, motor/sensory intact  Psych: calm, cooperative, normal affect

## 2022-06-20 NOTE — ED PROVIDER NOTE - CLINICAL SUMMARY MEDICAL DECISION MAKING FREE TEXT BOX
Pt is a 26 y.o. M hx of EtOH and opioid abuse presenting after mechanical fall. Pt refusing to wear C-collar despite knowing and understanding risks of not wearing cervical collar relayed to him.

## 2022-06-21 VITALS
DIASTOLIC BLOOD PRESSURE: 59 MMHG | SYSTOLIC BLOOD PRESSURE: 98 MMHG | TEMPERATURE: 98 F | RESPIRATION RATE: 18 BRPM | HEART RATE: 60 BPM | OXYGEN SATURATION: 98 %

## 2022-06-21 PROBLEM — F10.10 ALCOHOL ABUSE, UNCOMPLICATED: Chronic | Status: ACTIVE | Noted: 2022-06-14

## 2022-06-21 PROCEDURE — 70450 CT HEAD/BRAIN W/O DYE: CPT | Mod: 26,MA

## 2022-06-21 PROCEDURE — 72125 CT NECK SPINE W/O DYE: CPT | Mod: 26,MA

## 2022-12-15 ENCOUNTER — EMERGENCY (EMERGENCY)
Facility: HOSPITAL | Age: 27
LOS: 1 days | Discharge: HOME | End: 2022-12-15
Attending: EMERGENCY MEDICINE
Payer: MEDICAID

## 2022-12-15 VITALS
DIASTOLIC BLOOD PRESSURE: 74 MMHG | OXYGEN SATURATION: 96 % | RESPIRATION RATE: 20 BRPM | WEIGHT: 177.69 LBS | SYSTOLIC BLOOD PRESSURE: 119 MMHG | HEART RATE: 79 BPM | TEMPERATURE: 98 F

## 2022-12-15 PROCEDURE — 99283 EMERGENCY DEPT VISIT LOW MDM: CPT

## 2022-12-15 PROCEDURE — 99284 EMERGENCY DEPT VISIT MOD MDM: CPT

## 2022-12-15 RX ORDER — BUPRENORPHINE AND NALOXONE 2; .5 MG/1; MG/1
1 TABLET SUBLINGUAL ONCE
Refills: 0 | Status: DISCONTINUED | OUTPATIENT
Start: 2022-12-15 | End: 2022-12-15

## 2022-12-15 RX ORDER — BUPRENORPHINE AND NALOXONE 2; .5 MG/1; MG/1
1 TABLET SUBLINGUAL
Qty: 4 | Refills: 0
Start: 2022-12-15 | End: 2022-12-18

## 2022-12-15 RX ADMIN — BUPRENORPHINE AND NALOXONE 1 TABLET(S): 2; .5 TABLET SUBLINGUAL at 21:13

## 2022-12-15 NOTE — ED ADULT TRIAGE NOTE - CHIEF COMPLAINT QUOTE
27 M, nad, aaox3 c/o etoh and opiate withdrawal last use yesterday. Pt reports drinking a gallon and a half of vodka or torres daily x 2 years and using percocet and fentanyl daily for same period of time. Pt noted to have BLUE tremors, c/o nausea and headache, denies vomiting

## 2022-12-15 NOTE — CHART NOTE - NSCHARTNOTEFT_GEN_A_CORE
SW Note: Worker alerted by MD that pt is medically cleared for d/c and is requesting resources for substance abuse rehabs/detox/outpatient. Worker met with pt, leaving him resources for [facility], COS list of rehabs on [place] and other information including [facility]. Worker encouraged pt to contact [facility], as they will need to do a screening if he seeking a bed. Pt in agreement. MD made aware. No other SW needs.

## 2022-12-15 NOTE — ED PROVIDER NOTE - PATIENT PORTAL LINK FT
You can access the FollowMyHealth Patient Portal offered by [facility] by registering at the following website: [email or web address]. By joining [employer]’s FollowMyHealth portal, you will also be able to view your health information using other applications (apps) compatible with our system.

## 2022-12-15 NOTE — ED ADULT NURSE REASSESSMENT NOTE - NS ED NURSE REASSESS COMMENT FT1
Pt d/c by MD Price.  Sw gave pt resources.  NAD noted, respirations even and unlabored.  Safety precautions in place.  Pt demonstrated understanding of d/c instructions.

## 2022-12-15 NOTE — ED PROVIDER NOTE - OBJECTIVE STATEMENT
patient requesting detox placement; last alcohol and opioid ingestion one day ago; no other complaints

## 2022-12-15 NOTE — ED PROVIDER NOTE - NSICDXPASTSURGICALHX_GEN_ALL_CORE_FT
Pt's Mom given discharge instructions by Dr Reva Medrano she verbalizes an understanding pt stable at time of discharge
PAST SURGICAL HISTORY:  No significant past surgical history

## 2022-12-15 NOTE — ED PROVIDER NOTE - CLINICAL SUMMARY MEDICAL DECISION MAKING FREE TEXT BOX
unable to find open detox centers to accept patient at this time; patient clinically stable; Suboxone script sent; SW assessment noted; patient comfortable with plan

## 2022-12-15 NOTE — ED ADULT TRIAGE NOTE - GLASGOW COMA SCALE: SCORE, MLM
reviewed; no aberrant behavior identified, prescription authorized.  RX sent.  
Dr Jackson, please, advise  Patient asking for Lorazepam refill- via [employer] message  Last refill 3/23/2020, # 30 with 5 refills  Rx pended  LOV 6/15/2020  Future visit 12/18/2020  
15

## 2024-02-20 NOTE — ED ADULT NURSE NOTE - COVID-19 RESULT DATE/TIME
Medication Refill Request    Date of phone call: 2-20-24    Medication being requested: norco  mg sig: Take 1 tablet by mouth Every 6 (Six) Hours As Needed for Moderate Pain   Qty: 120    Date of last visit: 1-19-24    Date of last refill:     HOME up to date?:     Next Follow up?: 3-19-24    Any new pertinent information? (i.e, new medication allergies, new use of medications, change in patient's health or condition, non-compliance or inconsistency with prescribing agreement?):     Reviewed UDS and HOME. Both updated and appropriate. Refill appropriate.       Price (Do Not Change): 0.00 Detail Level: Simple Instructions: This plan will send the code FBSE to the PM system.  DO NOT or CHANGE the price. 13-Jun-2022 22:45

## 2024-11-14 ENCOUNTER — EMERGENCY (EMERGENCY)
Facility: HOSPITAL | Age: 29
LOS: 1 days | Discharge: HOME | End: 2024-11-14
Attending: STUDENT IN AN ORGANIZED HEALTH CARE EDUCATION/TRAINING PROGRAM
Payer: MEDICAID

## 2024-11-14 VITALS
RESPIRATION RATE: 16 BRPM | WEIGHT: 190.04 LBS | SYSTOLIC BLOOD PRESSURE: 129 MMHG | HEART RATE: 97 BPM | DIASTOLIC BLOOD PRESSURE: 89 MMHG | TEMPERATURE: 98 F | OXYGEN SATURATION: 100 % | HEIGHT: 67 IN

## 2024-11-14 PROCEDURE — 99282 EMERGENCY DEPT VISIT SF MDM: CPT

## 2024-11-14 PROCEDURE — 99283 EMERGENCY DEPT VISIT LOW MDM: CPT

## 2024-11-14 NOTE — ED PROVIDER NOTE - PATIENT PORTAL LINK FT
You can access the FollowMyHealth Patient Portal offered by [facility] by registering at the following website: [email or web address]. By joining [employer]’s FollowMyHealth portal, you will also be able to view your health information using other applications (apps) compatible with our system.

## 2024-11-14 NOTE — ED ADULT TRIAGE NOTE - CHIEF COMPLAINT QUOTE
sent in for eval from skilled nursing. as per pd, coworker had trouble waking pt up. pt arrives a and ox3, calm and appropriate. has no complaints.

## 2024-11-14 NOTE — ED PROVIDER NOTE - NS ED MD DISPO DISCHARGE CCDA
States he is returning a call to schedule a sooner appointment. RN re-scheduled patient for [address], cancelled Saturday appt. Advised to call back if worse or to ER. You are scheduled for an echocardiogram on 11/9/18 at 2:00 pm. Arrive at 1:45 pm.   You will register on the second floor of the hospital in [facility].  Follow up 6 months or sooner if needed   Patient/Caregiver provided printed discharge information.

## 2024-11-14 NOTE — ED ADULT TRIAGE NOTE - NS ED TRIAGE AVPU SCALE
Awaiting patients response.      Alert-The patient is alert, awake and responds to voice. The patient is oriented to time, place, and person. The triage nurse is able to obtain subjective information.

## 2024-11-14 NOTE — ED PROVIDER NOTE - OBJECTIVE STATEMENT
Pt is  a29 yo M BIB for eval. pt was being processed this morning and was hard to arouse so kina brought him in for eval. Pt awake alert and cooperative here with no complaints.  Pt states that hew as up all night and just fell asleep. No

## 2024-12-31 ENCOUNTER — EMERGENCY (EMERGENCY)
Facility: HOSPITAL | Age: 29
LOS: 1 days | Discharge: HOME | End: 2024-12-31
Attending: STUDENT IN AN ORGANIZED HEALTH CARE EDUCATION/TRAINING PROGRAM
Payer: MEDICARE

## 2024-12-31 VITALS
HEIGHT: 67 IN | TEMPERATURE: 98 F | WEIGHT: 168.43 LBS | SYSTOLIC BLOOD PRESSURE: 136 MMHG | HEART RATE: 118 BPM | RESPIRATION RATE: 18 BRPM | DIASTOLIC BLOOD PRESSURE: 77 MMHG | OXYGEN SATURATION: 96 %

## 2024-12-31 LAB
BASOPHILS # BLD AUTO: 0.08 K/UL — SIGNIFICANT CHANGE UP (ref 0–0.2)
BASOPHILS NFR BLD AUTO: 0.8 % — SIGNIFICANT CHANGE UP (ref 0–2)
EOSINOPHIL # BLD AUTO: 0.02 K/UL — SIGNIFICANT CHANGE UP (ref 0–0.5)
EOSINOPHIL NFR BLD AUTO: 0.2 % — SIGNIFICANT CHANGE UP (ref 0–6)
HCT VFR BLD CALC: 38.9 % — LOW (ref 39–50)
HGB BLD-MCNC: 12.3 G/DL — LOW (ref 13–17)
IMM GRANULOCYTES NFR BLD AUTO: 0.3 % — SIGNIFICANT CHANGE UP (ref 0–0.9)
LYMPHOCYTES # BLD AUTO: 1.62 K/UL — SIGNIFICANT CHANGE UP (ref 1–3.3)
LYMPHOCYTES # BLD AUTO: 15.8 % — SIGNIFICANT CHANGE UP (ref 13–44)
MCHC RBC-ENTMCNC: 26.3 PG — LOW (ref 27–34)
MCHC RBC-ENTMCNC: 31.6 G/DL — LOW (ref 32–36)
MCV RBC AUTO: 83.3 FL — SIGNIFICANT CHANGE UP (ref 80–100)
MONOCYTES # BLD AUTO: 0.48 K/UL — SIGNIFICANT CHANGE UP (ref 0–0.9)
MONOCYTES NFR BLD AUTO: 4.7 % — SIGNIFICANT CHANGE UP (ref 2–14)
NEUTROPHILS # BLD AUTO: 8.04 K/UL — HIGH (ref 1.8–7.4)
NEUTROPHILS NFR BLD AUTO: 78.2 % — HIGH (ref 43–77)
PLATELET # BLD AUTO: 468 K/UL — HIGH (ref 150–400)
RBC # BLD: 4.67 M/UL — SIGNIFICANT CHANGE UP (ref 4.2–5.8)
RBC # FLD: 13 % — SIGNIFICANT CHANGE UP (ref 10.3–14.5)
WBC # BLD: 10.27 K/UL — SIGNIFICANT CHANGE UP (ref 3.8–10.5)
WBC # FLD AUTO: 10.27 K/UL — SIGNIFICANT CHANGE UP (ref 3.8–10.5)

## 2024-12-31 PROCEDURE — 99285 EMERGENCY DEPT VISIT HI MDM: CPT

## 2024-12-31 PROCEDURE — 71045 X-RAY EXAM CHEST 1 VIEW: CPT | Mod: 26

## 2024-12-31 RX ORDER — SODIUM CHLORIDE 9 MG/ML
1000 INJECTION, SOLUTION INTRAMUSCULAR; INTRAVENOUS; SUBCUTANEOUS ONCE
Refills: 0 | Status: COMPLETED | OUTPATIENT
Start: 2024-12-31 | End: 2024-12-31

## 2024-12-31 RX ORDER — DIAZEPAM 5 MG
10 TABLET ORAL ONCE
Refills: 0 | Status: DISCONTINUED | OUTPATIENT
Start: 2024-12-31 | End: 2024-12-31

## 2024-12-31 RX ADMIN — SODIUM CHLORIDE 1000 MILLILITER(S): 9 INJECTION, SOLUTION INTRAMUSCULAR; INTRAVENOUS; SUBCUTANEOUS at 23:38

## 2024-12-31 RX ADMIN — Medication 10 MILLIGRAM(S): at 23:38

## 2024-12-31 NOTE — ED PROVIDER NOTE - NSFOLLOWUPINSTRUCTIONS_ED_ALL_ED_FT
Alcohol Use Disorder    WHAT YOU NEED TO KNOW:    Alcohol use disorder (AUD) is problem drinking. AUD includes alcohol abuse and alcohol dependency.     DISCHARGE INSTRUCTIONS:    Seek care immediately if:     Your heart is beating faster than usual.      You have hallucinations.      You cannot remember what happens while you are drinking.      You have seizures.    Contact your healthcare provider if:     You are anxious and have nausea.      Your hands are shaky and you are sweating heavily.      You have questions or concerns about your condition or care.    Follow up with your healthcare provider as directed: Do not try to stop drinking on your own. Your healthcare provider may need to help you withdraw from alcohol safely. He may need to admit you to the hospital. You may also need any of the following treatments:    Medicines to decrease your craving for alcohol      Support groups such as Alcoholics Anonymous       Therapy from a psychiatrist or psychologist       Admission to an inpatient facility for treatment for severe AUD    Interested in discussing options to reduce your alcohol or drug use?      [facility]: [phone]   [facility] Substance Abuse Services: [phone], option #2   Methadone Maintenance [facility] Ambulatory Opiate Detox: [phone]  [facility]: [phone]  [facility]: [phone]  [facility]: [phone]    [facility]: [phone], option #2   [facility]: [phone]    [facility]: [phone]    [facility] Central Intake: [phone]  [facility] Chemical Dependency/Ancillary Withdrawal: [phone]  [facility] Methadone Maintenance: [phone]    [facility]: [phone]  [facility] Addiction Treatment Services: [phone]    [facility]: 1-508-7-HOPENY    [facility] Office of Alcoholism and Substance Abuse Services (OASAS): https://www.oasas.[place].gov/providerdirectory/  [facility] for Addiction Services and Psychotherapy Interventions Research (CASPIR)  www.[facility].org     Interested in discussing options to reduce your tobacco use?    [facility] for Tobacco Control:  [phone]  [facility] QUITLINE: 4-245-YA-QUITS ([phone])    Interested in learning more about substance use?      http://rethinkingdrinking.niaaa.nih.gov   https://www.drugabuse.gov/patients-families     Learn more about opioid overdose prevention programs in [facility]:  http://www.[facility].ny.[facility]/diseases/aids/general/opioid_overdose_prevention/

## 2024-12-31 NOTE — ED ADULT TRIAGE NOTE - CHIEF COMPLAINT QUOTE
patient seeking rehab this evening, last alcohol drink 3 days ago, last use of opiates 2 days, patient reports body aches, nausea, vomiting,  anxious, no hi/si, with family in waiting room

## 2024-12-31 NOTE — ED PROVIDER NOTE - OBJECTIVE STATEMENT
29 year old male with pmhx of alcohol abuse and fentanyl abuse presents requesting detox. Pt states he normally drinks a gallon of cognac and smokes fentanyl daily - last use was 3 days ago. Has been to [facility] in the past and would like to go back. Complaining of tremors and tactile disturbances, denies headache, sweating, hallucinations, SI/HI, nausea, abdominal pain or any other complaints at this time. 29 year old male with pmhx of alcohol abuse and fentanyl abuse presents requesting detox. Pt states he normally drinks a gallon of cognac and smokes fentanyl daily - last use was 3 days ago. Has been to [facility] in the past and would like to go back. Also complaining of toothache for the past couple of days. Complaining of tremors and tactile disturbances, denies headache, sweating, hallucinations, SI/HI, nausea, abdominal pain, fever, or any other complaints at this time.

## 2024-12-31 NOTE — ED PROVIDER NOTE - ATTENDING CONTRIBUTION TO CARE
29 year old male with pmhx of alcohol abuse and fentanyl abuse presents requesting detox. Pt states he normally drinks a gallon of cognac and smokes fentanyl daily - last use was 3 days ago. Has been to [facility] in the past and would like to go back. Also complaining of toothache for the past couple of days. Complaining of tremors and tactile disturbances, denies headache, sweating, hallucinations, SI/HI, nausea, abdominal pain, fever, or any other complaints at this time.    mild wd symptoms noted with tremors, sweating and tachycardia. symptoms improved with benzos. also with left sided dental infection. no signs of abscess. will treat with abx. accepted to [facility] for in-pt detox. transport ordered

## 2024-12-31 NOTE — ED PROVIDER NOTE - CLINICAL SUMMARY MEDICAL DECISION MAKING FREE TEXT BOX
29 year old male with pmhx of alcohol abuse and fentanyl abuse presents requesting detox. Pt states he normally drinks a gallon of cognac and smokes fentanyl daily - last use was 3 days ago. Has been to [facility] in the past and would like to go back. Complaining of tremors and tactile disturbances,  Will obtain labs and call [facility] for placement. Pt tachycardiac and tremulous - will give valium for withdrawal symptoms. 29 year old male with pmhx of alcohol abuse and fentanyl abuse presents requesting detox. Pt states he normally drinks a gallon of cognac and smokes fentanyl daily - last use was 3 days ago. Has been to [facility] in the past and would like to go back. Complaining of tremors and tactile disturbances,  Will obtain labs and call [facility] for placement. Pt tachycardiac and tremulous - will give valium for withdrawal symptoms.  Will give augmentin for tooth.

## 2024-12-31 NOTE — ED PROVIDER NOTE - PHYSICAL EXAMINATION
General: tremulous and anxious appearing   HEENT: Normocephalic, atraumatic. Tongue fasciculations. No scleral icterus or conjunctival injection. EOMI. Moist mucous membranes. Oropharynx clear.   Neck:. Soft and supple.  Cardiac: tachycardic to 110s, Peripheral pulses 2+ and symmetric. No LE edema.  Resp: Lungs CTAB. No accessory muscle use  Abd: Soft, non-tender, non-distended. No guarding, rebound, or rigidity.  Back: Spine midline and non-tender.   Skin: No rashes, abrasions, or lacerations.  Neuro: AO x 4. Moves all extremities symmetrically. Motor strength and sensation grossly intact.  Psych: tremulous and anxious General: tremulous and anxious appearing   HEENT: Normocephalic, atraumatic. Tongue fasciculations. Lower left molar tooth missing, erythema around gums, no abscess or enlarged submandibular lymph nodes.  Neck:. Soft and supple.  Cardiac: tachycardic to 110s, Peripheral pulses 2+ and symmetric. No LE edema.  Resp: Lungs CTAB. No accessory muscle use  Abd: Soft, non-tender, non-distended. No guarding, rebound, or rigidity.  Back: Spine midline and non-tender.   Skin: No rashes, abrasions, or lacerations.  Neuro: AO x 4. Moves all extremities symmetrically. Motor strength and sensation grossly intact.  Psych: tremulous and anxious

## 2024-12-31 NOTE — ED PROVIDER NOTE - PATIENT PORTAL LINK FT
You can access the FollowMyHealth Patient Portal offered by [facility] by registering at the following website: [email or web address]. By joining [employer]’s FollowMyHealth portal, you will also be able to view your health information using other applications (apps) compatible with our system.

## 2025-01-01 VITALS
HEART RATE: 86 BPM | OXYGEN SATURATION: 98 % | TEMPERATURE: 98 F | RESPIRATION RATE: 18 BRPM | DIASTOLIC BLOOD PRESSURE: 76 MMHG | SYSTOLIC BLOOD PRESSURE: 118 MMHG

## 2025-01-01 LAB
ANION GAP SERPL CALC-SCNC: 14 MMOL/L — SIGNIFICANT CHANGE UP (ref 5–17)
APAP SERPL-MCNC: <3 UG/ML — LOW (ref 10–26)
BUN SERPL-MCNC: 7.6 MG/DL — LOW (ref 8–20)
CALCIUM SERPL-MCNC: 9.5 MG/DL — SIGNIFICANT CHANGE UP (ref 8.4–10.5)
CHLORIDE SERPL-SCNC: 103 MMOL/L — SIGNIFICANT CHANGE UP (ref 96–108)
CO2 SERPL-SCNC: 24 MMOL/L — SIGNIFICANT CHANGE UP (ref 22–29)
CREAT SERPL-MCNC: 0.68 MG/DL — SIGNIFICANT CHANGE UP (ref 0.5–1.3)
EGFR: 131 ML/MIN/1.73M2 — SIGNIFICANT CHANGE UP
ETHANOL SERPL-MCNC: <10 MG/DL — SIGNIFICANT CHANGE UP (ref 0–9)
FLUAV AG NPH QL: SIGNIFICANT CHANGE UP
FLUBV AG NPH QL: SIGNIFICANT CHANGE UP
GLUCOSE SERPL-MCNC: 89 MG/DL — SIGNIFICANT CHANGE UP (ref 70–99)
POTASSIUM SERPL-MCNC: 5.3 MMOL/L — SIGNIFICANT CHANGE UP (ref 3.5–5.3)
POTASSIUM SERPL-SCNC: 5.3 MMOL/L — SIGNIFICANT CHANGE UP (ref 3.5–5.3)
RSV RNA NPH QL NAA+NON-PROBE: SIGNIFICANT CHANGE UP
SALICYLATES SERPL-MCNC: <0.6 MG/DL — LOW (ref 10–20)
SARS-COV-2 RNA SPEC QL NAA+PROBE: SIGNIFICANT CHANGE UP
SODIUM SERPL-SCNC: 141 MMOL/L — SIGNIFICANT CHANGE UP (ref 135–145)

## 2025-01-01 PROCEDURE — 93005 ELECTROCARDIOGRAM TRACING: CPT

## 2025-01-01 PROCEDURE — 80307 DRUG TEST PRSMV CHEM ANLYZR: CPT

## 2025-01-01 PROCEDURE — 36415 COLL VENOUS BLD VENIPUNCTURE: CPT

## 2025-01-01 PROCEDURE — 71045 X-RAY EXAM CHEST 1 VIEW: CPT

## 2025-01-01 PROCEDURE — 85025 COMPLETE CBC W/AUTO DIFF WBC: CPT

## 2025-01-01 PROCEDURE — 96374 THER/PROPH/DIAG INJ IV PUSH: CPT

## 2025-01-01 PROCEDURE — 96375 TX/PRO/DX INJ NEW DRUG ADDON: CPT

## 2025-01-01 PROCEDURE — 99285 EMERGENCY DEPT VISIT HI MDM: CPT | Mod: 25

## 2025-01-01 PROCEDURE — 93010 ELECTROCARDIOGRAM REPORT: CPT

## 2025-01-01 PROCEDURE — 80048 BASIC METABOLIC PNL TOTAL CA: CPT

## 2025-01-01 PROCEDURE — 87637 SARSCOV2&INF A&B&RSV AMP PRB: CPT

## 2025-01-01 RX ORDER — AMOXICILLIN/POTASSIUM CLAV 875-125 MG
1 TABLET ORAL ONCE
Refills: 0 | Status: COMPLETED | OUTPATIENT
Start: 2025-01-01 | End: 2025-01-01

## 2025-01-01 RX ORDER — ACETAMINOPHEN 80 MG/.8ML
1000 SOLUTION/ DROPS ORAL ONCE
Refills: 0 | Status: COMPLETED | OUTPATIENT
Start: 2025-01-01 | End: 2025-01-01

## 2025-01-01 RX ORDER — KETOROLAC TROMETHAMINE 30 MG/ML
15 INJECTION INTRAMUSCULAR; INTRAVENOUS ONCE
Refills: 0 | Status: DISCONTINUED | OUTPATIENT
Start: 2025-01-01 | End: 2025-01-01

## 2025-01-01 RX ADMIN — KETOROLAC TROMETHAMINE 15 MILLIGRAM(S): 30 INJECTION INTRAMUSCULAR; INTRAVENOUS at 04:05

## 2025-01-01 RX ADMIN — Medication 1 TABLET(S): at 03:13

## 2025-01-01 RX ADMIN — ACETAMINOPHEN 400 MILLIGRAM(S): 80 SOLUTION/ DROPS ORAL at 01:11

## 2025-01-01 NOTE — ED ADULT NURSE NOTE - OBJECTIVE STATEMENT
Patient presents to ED requesting detox.  States he usually drinks about 1 gallon of cognac daily and smokes fentanyl.  Last use of drugs or alcohol was 3 days ago.  Currently c/o tremors and "pins and needles," slight headache.  Denies c/p, sob, nv/d/, fever/chills.  No further complaints at this time.

## 2025-01-01 NOTE — ED ADULT NURSE REASSESSMENT NOTE - NS ED NURSE REASSESS COMMENT FT1
Assumed care from MALU OWENS at 0730, patient A&Ox3 resting in bed, awaiting ambulance for transport to detox facility, vitals stable, food tray provided.

## 2025-01-01 NOTE — ED ADULT NURSE NOTE - NSFALLUNIVINTERV_ED_ALL_ED
Bed/Stretcher in lowest position, wheels locked, appropriate side rails in place/Call bell, personal items and telephone in reach/Instruct patient to call for assistance before getting out of bed/chair/stretcher/Non-slip footwear applied when patient is off stretcher/[place] to call system/Physically safe environment - no spills, clutter or unnecessary equipment/Purposeful proactive rounding/Room/bathroom lighting operational, light cord in reach

## 2025-02-25 NOTE — ED PROVIDER NOTE - CLINICAL SUMMARY MEDICAL DECISION MAKING FREE TEXT BOX
See pt's request
25y M w/ hx opioid use, presenting for opioid withdrawal symptoms.  VS stable, pt well appearing.  Symptomatic treatment, reassess.